# Patient Record
Sex: FEMALE | Race: WHITE | NOT HISPANIC OR LATINO | Employment: OTHER | URBAN - METROPOLITAN AREA
[De-identification: names, ages, dates, MRNs, and addresses within clinical notes are randomized per-mention and may not be internally consistent; named-entity substitution may affect disease eponyms.]

---

## 2023-11-21 ENCOUNTER — OFFICE VISIT (OUTPATIENT)
Dept: CARDIOLOGY | Facility: CLINIC | Age: 78
End: 2023-11-21
Payer: MEDICARE

## 2023-11-21 ENCOUNTER — TELEPHONE (OUTPATIENT)
Dept: CARDIOLOGY | Facility: CLINIC | Age: 78
End: 2023-11-21

## 2023-11-21 VITALS
HEIGHT: 60 IN | WEIGHT: 177 LBS | HEART RATE: 73 BPM | BODY MASS INDEX: 34.75 KG/M2 | SYSTOLIC BLOOD PRESSURE: 159 MMHG | DIASTOLIC BLOOD PRESSURE: 65 MMHG

## 2023-11-21 DIAGNOSIS — I10 HYPERTENSION, UNSPECIFIED TYPE: Primary | ICD-10-CM

## 2023-11-21 PROCEDURE — 1160F RVW MEDS BY RX/DR IN RCRD: CPT | Performed by: NURSE PRACTITIONER

## 2023-11-21 PROCEDURE — 3077F SYST BP >= 140 MM HG: CPT | Performed by: NURSE PRACTITIONER

## 2023-11-21 PROCEDURE — 3078F DIAST BP <80 MM HG: CPT | Performed by: NURSE PRACTITIONER

## 2023-11-21 PROCEDURE — 99213 OFFICE O/P EST LOW 20 MIN: CPT | Performed by: NURSE PRACTITIONER

## 2023-11-21 PROCEDURE — 99203 OFFICE O/P NEW LOW 30 MIN: CPT | Performed by: NURSE PRACTITIONER

## 2023-11-21 PROCEDURE — 1159F MED LIST DOCD IN RCRD: CPT | Performed by: NURSE PRACTITIONER

## 2023-11-21 PROCEDURE — 1036F TOBACCO NON-USER: CPT | Performed by: NURSE PRACTITIONER

## 2023-11-21 RX ORDER — SPIRONOLACTONE 50 MG/1
50 TABLET, FILM COATED ORAL DAILY
Qty: 30 TABLET | Refills: 0 | Status: SHIPPED | OUTPATIENT
Start: 2023-11-21 | End: 2023-12-20 | Stop reason: SDUPTHER

## 2023-11-21 RX ORDER — SERTRALINE HYDROCHLORIDE 25 MG/1
25 TABLET, FILM COATED ORAL
COMMUNITY
Start: 2023-11-13 | End: 2023-12-13

## 2023-11-21 RX ORDER — ROSUVASTATIN CALCIUM 5 MG/1
10 TABLET, COATED ORAL
COMMUNITY
Start: 2021-06-24

## 2023-11-21 RX ORDER — ASPIRIN 325 MG
50000 TABLET, DELAYED RELEASE (ENTERIC COATED) ORAL WEEKLY
COMMUNITY
Start: 2014-01-24

## 2023-11-21 RX ORDER — HYDRALAZINE HYDROCHLORIDE 25 MG/1
25 TABLET, FILM COATED ORAL AS NEEDED
Qty: 90 TABLET | Refills: 0 | Status: SHIPPED | OUTPATIENT
Start: 2023-11-21 | End: 2024-11-20

## 2023-11-21 RX ORDER — LISINOPRIL 20 MG/1
10 TABLET ORAL 2 TIMES DAILY
COMMUNITY
Start: 2023-05-10 | End: 2024-05-01 | Stop reason: WASHOUT

## 2023-11-21 NOTE — PATIENT INSTRUCTIONS
Check BP   - after sitting for 5-10 minutes  - not after a cup coffee   - wait until your meds are in your system by an hour     Hydralazine as needed for top number > 160    Hold lisinopril for right now    My chart me on Monday/Tuesday with update on blood pressure and numbness in legs/face

## 2023-11-21 NOTE — PROGRESS NOTES
Chief Complaint:   Hypertension    History Of Present Illness:    Maricruz Guthrie is a 78 y.o. female here with hypertension.     When blood pressure high she gets a HA and does not feel well.     2x in last week in hospital. One systolic was 222  4x gone to hospital for hypertension per last year    Follows with nephrology.     Cannot take losartan. Cannot remember why.     Notes numbness in b/l legs. CT of head negative for CVA.        Allergies:  Patient has no allergy information on record.    Visit Vitals  /65 (BP Location: Right arm, Patient Position: Sitting, BP Cuff Size: Large adult)   Pulse 73   Ht 1.524 m (5')   Wt 80.3 kg (177 lb)   BMI 34.57 kg/m²   Smoking Status Never   BSA 1.84 m²         Review of Systems  All pertinent systems have been reviewed and are negative except for what is stated in the history of present illness.    All other systems have been reviewed and are negative and noncontributory to this patient's current ailments.       Objective   Vitals reviewed.   Constitutional:       Appearance: Healthy appearance. Not in distress.   Neck:      Vascular: No JVR. JVD normal.   Pulmonary:      Effort: Pulmonary effort is normal.      Breath sounds: Normal breath sounds. No wheezing. No rhonchi. No rales.   Chest:      Chest wall: Not tender to palpatation.   Cardiovascular:      PMI at left midclavicular line. Normal rate. Regular rhythm. Normal S1. Normal S2.       Murmurs: There is no murmur.      No gallop.  No click. No rub.   Edema:     Peripheral edema absent.   Abdominal:      General: Bowel sounds are normal.      Palpations: Abdomen is soft.      Tenderness: There is no abdominal tenderness.   Musculoskeletal: Normal range of motion.         General: No tenderness. Skin:     General: Skin is warm and dry.   Neurological:      General: No focal deficit present.      Mental Status: Alert and oriented to person, place and time.       Assessment/Plan   Diagnoses and all orders for  this visit:  Hypertension, unspecified type  - home bp remains suboptimal  - we discussed how to check bp  - start anca, if tolerate, we will check labs one week after being on it  - we will hold lisinopril for right now to see if numbness resolves. While not a typical side effect of lisinopril it started when she was placed on it and zoloft. She was taken off zoloft with no change in numbness.  - hydralazine prn for systolics >160  - last Cr 1.26, Cr 4.3      Current Outpatient Medications:     cholecalciferol (Vitamin D-3) 50,000 unit capsule, Take 1 capsule (50,000 Units) by mouth once a week., Disp: , Rfl:     cyanocobalamin, vitamin B-12, 1,000 mcg/mL drops, Take by mouth once daily., Disp: , Rfl:     lisinopril 20 mg tablet, Take 1 tablet (20 mg) by mouth twice a day., Disp: , Rfl:     rosuvastatin (Crestor) 5 mg tablet, Take 1 tablet (5 mg) by mouth once daily., Disp: , Rfl:     sertraline (Zoloft) 25 mg tablet, Take 1 tablet (25 mg) by mouth once daily., Disp: , Rfl:     hydrALAZINE (Apresoline) 25 mg tablet, Take 1 tablet (25 mg) by mouth if needed (Take for systolic > 160. Can take up to 3 times a day)., Disp: 90 tablet, Rfl: 0    spironolactone (Aldactone) 50 mg tablet, Take 1 tablet (50 mg) by mouth once daily., Disp: 30 tablet, Rfl: 0    -     spironolactone (Aldactone) 50 mg tablet; Take 1 tablet (50 mg) by mouth once daily.  -     hydrALAZINE (Apresoline) 25 mg tablet; Take 1 tablet (25 mg) by mouth if needed (Take for systolic > 160. Can take up to 3 times a day).

## 2023-11-22 NOTE — TELEPHONE ENCOUNTER
"Spoke with Diana, pharmacist at [facility]. Reviewed Essie's instructions with her \"  Patient is currently holding her lisinopril and we will check labs once she is on the anca for a week   \" Verbalized understanding.   "

## 2023-12-20 DIAGNOSIS — I10 HYPERTENSION, UNSPECIFIED TYPE: ICD-10-CM

## 2023-12-20 RX ORDER — SPIRONOLACTONE 50 MG/1
50 TABLET, FILM COATED ORAL DAILY
Qty: 90 TABLET | Refills: 3 | Status: SHIPPED | OUTPATIENT
Start: 2023-12-20 | End: 2023-12-27 | Stop reason: SDUPTHER

## 2023-12-27 DIAGNOSIS — I10 HYPERTENSION, UNSPECIFIED TYPE: ICD-10-CM

## 2023-12-27 RX ORDER — SPIRONOLACTONE 50 MG/1
50 TABLET, FILM COATED ORAL DAILY
Qty: 90 TABLET | Refills: 3 | Status: SHIPPED | OUTPATIENT
Start: 2023-12-27 | End: 2024-12-26

## 2024-03-04 ENCOUNTER — LAB (OUTPATIENT)
Dept: LAB | Facility: LAB | Age: 79
End: 2024-03-04
Payer: MEDICARE

## 2024-03-04 DIAGNOSIS — N18.30 CHRONIC KIDNEY DISEASE, STAGE 3 UNSPECIFIED (MULTI): Primary | ICD-10-CM

## 2024-03-04 DIAGNOSIS — E55.9 VITAMIN D DEFICIENCY, UNSPECIFIED: ICD-10-CM

## 2024-03-04 DIAGNOSIS — I10 ESSENTIAL (PRIMARY) HYPERTENSION: ICD-10-CM

## 2024-03-04 DIAGNOSIS — M10.00 IDIOPATHIC GOUT, UNSPECIFIED SITE: ICD-10-CM

## 2024-03-04 DIAGNOSIS — N18.30 CHRONIC KIDNEY DISEASE, STAGE 3 UNSPECIFIED (MULTI): ICD-10-CM

## 2024-03-04 LAB
25(OH)D3 SERPL-MCNC: 79 NG/ML (ref 30–100)
ALBUMIN SERPL BCP-MCNC: 4.3 G/DL (ref 3.4–5)
ANION GAP SERPL CALC-SCNC: 13 MMOL/L (ref 10–20)
APPEARANCE UR: CLEAR
BACTERIA #/AREA URNS AUTO: ABNORMAL /HPF
BILIRUB UR STRIP.AUTO-MCNC: NEGATIVE MG/DL
BUN SERPL-MCNC: 28 MG/DL (ref 6–23)
CALCIUM SERPL-MCNC: 10.2 MG/DL (ref 8.6–10.6)
CHLORIDE SERPL-SCNC: 103 MMOL/L (ref 98–107)
CO2 SERPL-SCNC: 29 MMOL/L (ref 21–32)
COLOR UR: COLORLESS
CREAT SERPL-MCNC: 1.89 MG/DL (ref 0.5–1.05)
CREAT UR-MCNC: 52.2 MG/DL (ref 20–320)
CREAT UR-MCNC: 52.2 MG/DL (ref 20–320)
EGFRCR SERPLBLD CKD-EPI 2021: 27 ML/MIN/1.73M*2
GLUCOSE SERPL-MCNC: 114 MG/DL (ref 74–99)
GLUCOSE UR STRIP.AUTO-MCNC: NORMAL MG/DL
HYALINE CASTS #/AREA URNS AUTO: ABNORMAL /LPF
KETONES UR STRIP.AUTO-MCNC: NEGATIVE MG/DL
LEUKOCYTE ESTERASE UR QL STRIP.AUTO: ABNORMAL
MICROALBUMIN UR-MCNC: <7 MG/L
MICROALBUMIN/CREAT UR: NORMAL MG/G{CREAT}
MUCOUS THREADS #/AREA URNS AUTO: ABNORMAL /LPF
NITRITE UR QL STRIP.AUTO: NEGATIVE
PH UR STRIP.AUTO: 5 [PH]
PHOSPHATE SERPL-MCNC: 3.1 MG/DL (ref 2.5–4.9)
POTASSIUM SERPL-SCNC: 4.6 MMOL/L (ref 3.5–5.3)
PROT UR STRIP.AUTO-MCNC: NEGATIVE MG/DL
PROT UR-ACNC: <4 MG/DL (ref 5–24)
PROT/CREAT UR: ABNORMAL MG/G{CREAT}
PTH-INTACT SERPL-MCNC: 36.3 PG/ML (ref 18.5–88)
RBC # UR STRIP.AUTO: NEGATIVE /UL
RBC #/AREA URNS AUTO: ABNORMAL /HPF
SODIUM SERPL-SCNC: 140 MMOL/L (ref 136–145)
SP GR UR STRIP.AUTO: 1
SQUAMOUS #/AREA URNS AUTO: ABNORMAL /HPF
URATE SERPL-MCNC: 8 MG/DL (ref 2.3–6.7)
UROBILINOGEN UR STRIP.AUTO-MCNC: NORMAL MG/DL
WBC #/AREA URNS AUTO: ABNORMAL /HPF
YEAST BUDDING #/AREA UR COMP ASSIST: PRESENT /HPF

## 2024-03-04 PROCEDURE — 36415 COLL VENOUS BLD VENIPUNCTURE: CPT

## 2024-03-04 PROCEDURE — 82570 ASSAY OF URINE CREATININE: CPT

## 2024-03-04 PROCEDURE — 84156 ASSAY OF PROTEIN URINE: CPT

## 2024-03-04 PROCEDURE — 80069 RENAL FUNCTION PANEL: CPT

## 2024-03-04 PROCEDURE — 82306 VITAMIN D 25 HYDROXY: CPT

## 2024-03-04 PROCEDURE — 84550 ASSAY OF BLOOD/URIC ACID: CPT

## 2024-03-04 PROCEDURE — 83970 ASSAY OF PARATHORMONE: CPT

## 2024-03-04 PROCEDURE — 81001 URINALYSIS AUTO W/SCOPE: CPT

## 2024-03-04 PROCEDURE — 82043 UR ALBUMIN QUANTITATIVE: CPT

## 2024-03-13 ENCOUNTER — TELEPHONE (OUTPATIENT)
Dept: CARDIOLOGY | Facility: CLINIC | Age: 79
End: 2024-03-13
Payer: MEDICARE

## 2024-03-19 ENCOUNTER — OFFICE VISIT (OUTPATIENT)
Dept: CARDIOLOGY | Facility: CLINIC | Age: 79
End: 2024-03-19
Payer: MEDICARE

## 2024-03-19 VITALS
SYSTOLIC BLOOD PRESSURE: 122 MMHG | HEIGHT: 60 IN | HEART RATE: 58 BPM | DIASTOLIC BLOOD PRESSURE: 76 MMHG | WEIGHT: 175 LBS | TEMPERATURE: 97.9 F | BODY MASS INDEX: 34.36 KG/M2

## 2024-03-19 DIAGNOSIS — I10 HYPERTENSION, UNSPECIFIED TYPE: ICD-10-CM

## 2024-03-19 DIAGNOSIS — R00.2 PALPITATIONS: ICD-10-CM

## 2024-03-19 PROCEDURE — 93005 ELECTROCARDIOGRAM TRACING: CPT | Performed by: NURSE PRACTITIONER

## 2024-03-19 PROCEDURE — 1160F RVW MEDS BY RX/DR IN RCRD: CPT | Performed by: NURSE PRACTITIONER

## 2024-03-19 PROCEDURE — 1036F TOBACCO NON-USER: CPT | Performed by: NURSE PRACTITIONER

## 2024-03-19 PROCEDURE — 1159F MED LIST DOCD IN RCRD: CPT | Performed by: NURSE PRACTITIONER

## 2024-03-19 PROCEDURE — 99214 OFFICE O/P EST MOD 30 MIN: CPT | Performed by: NURSE PRACTITIONER

## 2024-03-19 PROCEDURE — 3074F SYST BP LT 130 MM HG: CPT | Performed by: NURSE PRACTITIONER

## 2024-03-19 PROCEDURE — 3078F DIAST BP <80 MM HG: CPT | Performed by: NURSE PRACTITIONER

## 2024-03-19 RX ORDER — CHLORTHALIDONE 25 MG/1
25 TABLET ORAL DAILY
COMMUNITY

## 2024-03-19 NOTE — PROGRESS NOTES
"Chief Complaint:   Palpitations     History Of Present Illness:    Maricruz Guthrie is a 78 y.o. female here with palpitations. Describes as a intermittent racing sensation. Feel like running then it would resolve. Has not had over the past several days. Always at rest.     Last week renal decreased lisinopril. Since then the racing heart sensation subsided.      Notes numbness in b/l legs. CT of head negative for CVA.   Once a month for the past 3 months her hands turned blue. Does not know why. Was not cold. Self resolved. If continues she will need referral to vascular. Was not SOB. Had no other symptoms when this occurred.        Allergies:  Statins-hmg-coa reductase inhibitors, Penicillins, and Niacin    Review of Systems  All pertinent systems have been reviewed and are negative except for what is stated in the history of present illness.    All other systems have been reviewed and are negative and noncontributory to this patient's current ailments.     Visit Vitals  /76 (BP Location: Right arm)   Pulse 58   Temp 36.6 °C (97.9 °F)   Ht 1.524 m (5')   Wt 79.4 kg (175 lb)   BMI 34.18 kg/m²   Smoking Status Former   BSA 1.83 m²       Last Labs:  CBC -  No results found for: \"WBC\", \"HGB\", \"HCT\", \"MCV\", \"PLT\"    CMP -  Lab Results   Component Value Date    CALCIUM 10.2 03/04/2024    PHOS 3.1 03/04/2024    ALBUMIN 4.3 03/04/2024       LIPID PANEL -   No results found for: \"CHOL\", \"TRIG\", \"HDL\", \"CHHDL\", \"LDLF\", \"VLDL\", \"NHDL\"    RENAL FUNCTION PANEL -   Lab Results   Component Value Date    GLUCOSE 114 (H) 03/04/2024     03/04/2024    K 4.6 03/04/2024     03/04/2024    CO2 29 03/04/2024    ANIONGAP 13 03/04/2024    BUN 28 (H) 03/04/2024    CREATININE 1.89 (H) 03/04/2024    CALCIUM 10.2 03/04/2024    PHOS 3.1 03/04/2024    ALBUMIN 4.3 03/04/2024        Lab Results   Component Value Date    HGBA1C 5.9 (H) 08/29/2023         Objective   Vitals reviewed.   Constitutional:       Appearance: Healthy " appearance. Not in distress.   Eyes:      Conjunctiva/sclera: Conjunctivae normal.   Neck:      Vascular: No JVR. JVD normal.   Pulmonary:      Effort: Pulmonary effort is normal.      Breath sounds: Normal breath sounds. No wheezing. No rhonchi. No rales.   Chest:      Chest wall: Not tender to palpatation.   Cardiovascular:      PMI at left midclavicular line. Normal rate. Regular rhythm. Normal S1. Normal S2.       Murmurs: There is no murmur.      No gallop.  No click. No rub.   Edema:     Peripheral edema absent.   Abdominal:      General: Bowel sounds are normal.      Palpations: Abdomen is soft.      Tenderness: There is no abdominal tenderness.   Musculoskeletal: Normal range of motion.         General: No tenderness. Skin:     General: Skin is warm and dry.   Neurological:      General: No focal deficit present.      Mental Status: Alert and oriented to person, place and time.   Psychiatric:         Attention and Perception: Attention normal.         Mood and Affect: Mood normal.       Assessment/Plan   Diagnoses and all orders for this visit:  Hypertension, unspecified type  - bp well controlled   - nephrology decreased lisinopril  Palpitations  - improved post decrease in lisinopril  - if palps return will place monitor     Follow up in 1 month    1 month follow up       Current Outpatient Medications:     chlorthalidone (Hygroton) 25 mg tablet, Take 1 tablet (25 mg) by mouth once daily., Disp: , Rfl:     cholecalciferol (Vitamin D-3) 50,000 unit capsule, Take 1 capsule (50,000 Units) by mouth once a week., Disp: , Rfl:     cyanocobalamin, vitamin B-12, 1,000 mcg/mL drops, Take by mouth once daily., Disp: , Rfl:     lisinopril 20 mg tablet, Take 0.5 tablets (10 mg) by mouth twice a day., Disp: , Rfl:     rosuvastatin (Crestor) 5 mg tablet, Take 2 tablets (10 mg) by mouth once daily., Disp: , Rfl:     spironolactone (Aldactone) 50 mg tablet, Take 1 tablet (50 mg) by mouth once daily., Disp: 90 tablet, Rfl:  3    hydrALAZINE (Apresoline) 25 mg tablet, Take 1 tablet (25 mg) by mouth if needed (Take for systolic > 160. Can take up to 3 times a day). (Patient not taking: Reported on 3/19/2024), Disp: 90 tablet, Rfl: 0    sertraline (Zoloft) 25 mg tablet, Take 1 tablet (25 mg) by mouth once daily., Disp: , Rfl:     Exclusive of any other services or procedures performed, I, Essie PATE, spent 30 minutes in duration for this visit today.  This time consisted of chart review, obtaining history, and/or performing the exam as documented above, as well as, documenting the clinical information for the encounter in the electronic record, discussing treatment options, plans, and/or goals with patient, family, and/or caregiver, refilling medications, updating the electronic record, ordering medicines, lab work, imaging, referrals, and/or procedures as documented above and communicating with other [facility] professionals. I have discussed the results of laboratory, radiology, and cardiology studies with the patient and their family/caregiver.

## 2024-03-25 LAB
ATRIAL RATE: 52 BPM
P AXIS: 37 DEGREES
P OFFSET: 189 MS
P ONSET: 144 MS
PR INTERVAL: 140 MS
Q ONSET: 214 MS
QRS COUNT: 8 BEATS
QRS DURATION: 86 MS
QT INTERVAL: 430 MS
QTC CALCULATION(BAZETT): 399 MS
QTC FREDERICIA: 409 MS
R AXIS: -21 DEGREES
T AXIS: 65 DEGREES
T OFFSET: 429 MS
VENTRICULAR RATE: 52 BPM

## 2024-05-01 ENCOUNTER — OFFICE VISIT (OUTPATIENT)
Dept: CARDIOLOGY | Facility: CLINIC | Age: 79
End: 2024-05-01
Payer: MEDICARE

## 2024-05-01 VITALS
BODY MASS INDEX: 34.54 KG/M2 | HEIGHT: 60 IN | DIASTOLIC BLOOD PRESSURE: 66 MMHG | WEIGHT: 175.9 LBS | HEART RATE: 58 BPM | SYSTOLIC BLOOD PRESSURE: 118 MMHG

## 2024-05-01 DIAGNOSIS — R00.2 PALPITATIONS: ICD-10-CM

## 2024-05-01 DIAGNOSIS — I10 HYPERTENSION, UNSPECIFIED TYPE: Primary | ICD-10-CM

## 2024-05-01 PROCEDURE — 99213 OFFICE O/P EST LOW 20 MIN: CPT | Performed by: NURSE PRACTITIONER

## 2024-05-01 PROCEDURE — 1036F TOBACCO NON-USER: CPT | Performed by: NURSE PRACTITIONER

## 2024-05-01 PROCEDURE — 3078F DIAST BP <80 MM HG: CPT | Performed by: NURSE PRACTITIONER

## 2024-05-01 PROCEDURE — 3074F SYST BP LT 130 MM HG: CPT | Performed by: NURSE PRACTITIONER

## 2024-05-01 PROCEDURE — 1160F RVW MEDS BY RX/DR IN RCRD: CPT | Performed by: NURSE PRACTITIONER

## 2024-05-01 PROCEDURE — 1159F MED LIST DOCD IN RCRD: CPT | Performed by: NURSE PRACTITIONER

## 2024-05-01 RX ORDER — LOSARTAN POTASSIUM 25 MG/1
12.5 TABLET ORAL DAILY
Qty: 30 TABLET | Refills: 0 | Status: SHIPPED | OUTPATIENT
Start: 2024-05-01 | End: 2025-05-01

## 2024-05-01 NOTE — PROGRESS NOTES
"Chief Complaint:   Palpitations     History Of Present Illness:    Maricruz Guthrie is a 78 y.o. female here with palpitations. Describes as a intermittent racing sensation. Feel like running then it would resolve. It improved with decrease in lisinopril.   Today her palpitations and chest pain remain resolved.      Allergies:  Statins-hmg-coa reductase inhibitors, Penicillins, and Niacin    Review of Systems  All pertinent systems have been reviewed and are negative except for what is stated in the history of present illness.    All other systems have been reviewed and are negative and noncontributory to this patient's current ailments.     Visit Vitals  /66 (BP Location: Right arm, Patient Position: Sitting, BP Cuff Size: Large adult)   Pulse 58   Ht 1.524 m (5')   Wt 79.8 kg (175 lb 14.4 oz)   BMI 34.35 kg/m²   Smoking Status Former   BSA 1.84 m²         Last Labs:  CBC -  No results found for: \"WBC\", \"HGB\", \"HCT\", \"MCV\", \"PLT\"    CMP -  Lab Results   Component Value Date    CALCIUM 10.2 03/04/2024    PHOS 3.1 03/04/2024    ALBUMIN 4.3 03/04/2024       LIPID PANEL -   No results found for: \"CHOL\", \"TRIG\", \"HDL\", \"CHHDL\", \"LDLF\", \"VLDL\", \"NHDL\"    RENAL FUNCTION PANEL -   Lab Results   Component Value Date    GLUCOSE 114 (H) 03/04/2024     03/04/2024    K 4.6 03/04/2024     03/04/2024    CO2 29 03/04/2024    ANIONGAP 13 03/04/2024    BUN 28 (H) 03/04/2024    CREATININE 1.89 (H) 03/04/2024    CALCIUM 10.2 03/04/2024    PHOS 3.1 03/04/2024    ALBUMIN 4.3 03/04/2024        Lab Results   Component Value Date    HGBA1C 5.9 (H) 08/29/2023         Objective   Vitals reviewed.   Constitutional:       Appearance: Healthy appearance. Not in distress.   Eyes:      Conjunctiva/sclera: Conjunctivae normal.   Neck:      Vascular: No JVR. JVD normal.   Pulmonary:      Effort: Pulmonary effort is normal.      Breath sounds: Normal breath sounds. No wheezing. No rhonchi. No rales.   Chest:      Chest wall: Not " tender to palpatation.   Cardiovascular:      PMI at left midclavicular line. Normal rate. Regular rhythm. Normal S1. Normal S2.       Murmurs: There is no murmur.      No gallop.  No click. No rub.   Edema:     Peripheral edema absent.   Abdominal:      General: Bowel sounds are normal.      Palpations: Abdomen is soft.      Tenderness: There is no abdominal tenderness.   Musculoskeletal: Normal range of motion.         General: No tenderness. Skin:     General: Skin is warm and dry.   Neurological:      General: No focal deficit present.      Mental Status: Alert and oriented to person, place and time.   Psychiatric:         Attention and Perception: Attention normal.         Mood and Affect: Mood normal.       Assessment/Plan   Diagnoses and all orders for this visit:  Hypertension, unspecified type  - bp well controlled   - chronic cough may be 2/2 lisinopril change to losartan. Will call with bp readings after being on arb.   Palpitations  - resolved     Follow up annually       Current Outpatient Medications:     chlorthalidone (Hygroton) 25 mg tablet, Take 1 tablet (25 mg) by mouth once daily. Taking 1/2 pill, Disp: , Rfl:     cholecalciferol (Vitamin D-3) 50,000 unit capsule, Take 1 capsule (50,000 Units) by mouth once a week., Disp: , Rfl:     cyanocobalamin, vitamin B-12, 1,000 mcg/mL drops, Take by mouth once daily., Disp: , Rfl:     hydrALAZINE (Apresoline) 25 mg tablet, Take 1 tablet (25 mg) by mouth if needed (Take for systolic > 160. Can take up to 3 times a day). (Patient taking differently: Take 1 tablet (25 mg) by mouth if needed (Take for systolic > 160. Can take up to 3 times a day). Taking 1/2 tab), Disp: 90 tablet, Rfl: 0    rosuvastatin (Crestor) 5 mg tablet, Take 2 tablets (10 mg) by mouth once daily., Disp: , Rfl:     spironolactone (Aldactone) 50 mg tablet, Take 1 tablet (50 mg) by mouth once daily., Disp: 90 tablet, Rfl: 3    losartan (Cozaar) 25 mg tablet, Take 0.5 tablets (12.5 mg) by  mouth once daily., Disp: 30 tablet, Rfl: 0    sertraline (Zoloft) 25 mg tablet, Take 1 tablet (25 mg) by mouth once daily., Disp: , Rfl:       Exclusive of any other services or procedures performed, I, Essie PATE, spent 20 minutes in duration for this visit today.  This time consisted of chart review, obtaining history, and/or performing the exam as documented above, as well as, documenting the clinical information for the encounter in the electronic record, discussing treatment options, plans, and/or goals with patient, family, and/or caregiver, refilling medications, updating the electronic record, ordering medicines, lab work, imaging, referrals, and/or procedures as documented above and communicating with other [facility] professionals. I have discussed the results of laboratory, radiology, and cardiology studies with the patient and their family/caregiver.

## 2024-06-25 DIAGNOSIS — I10 HYPERTENSION, UNSPECIFIED TYPE: ICD-10-CM

## 2024-06-25 RX ORDER — LOSARTAN POTASSIUM 25 MG/1
25 TABLET ORAL DAILY
Qty: 45 TABLET | Refills: 3 | Status: SHIPPED | OUTPATIENT
Start: 2024-06-25

## 2024-07-01 ENCOUNTER — LAB (OUTPATIENT)
Dept: LAB | Facility: LAB | Age: 79
End: 2024-07-01
Payer: MEDICARE

## 2024-07-01 DIAGNOSIS — N18.30 CHRONIC KIDNEY DISEASE, STAGE 3 UNSPECIFIED (MULTI): Primary | ICD-10-CM

## 2024-07-01 DIAGNOSIS — I10 ESSENTIAL (PRIMARY) HYPERTENSION: ICD-10-CM

## 2024-07-01 LAB
ALBUMIN SERPL BCP-MCNC: 4.3 G/DL (ref 3.4–5)
ANION GAP SERPL CALC-SCNC: 13 MMOL/L (ref 10–20)
BUN SERPL-MCNC: 28 MG/DL (ref 6–23)
CALCIUM SERPL-MCNC: 10.2 MG/DL (ref 8.6–10.6)
CHLORIDE SERPL-SCNC: 103 MMOL/L (ref 98–107)
CO2 SERPL-SCNC: 28 MMOL/L (ref 21–32)
CREAT SERPL-MCNC: 1.84 MG/DL (ref 0.5–1.05)
EGFRCR SERPLBLD CKD-EPI 2021: 28 ML/MIN/1.73M*2
GLUCOSE SERPL-MCNC: 151 MG/DL (ref 74–99)
PHOSPHATE SERPL-MCNC: 3 MG/DL (ref 2.5–4.9)
POTASSIUM SERPL-SCNC: 4.5 MMOL/L (ref 3.5–5.3)
SODIUM SERPL-SCNC: 139 MMOL/L (ref 136–145)

## 2024-07-01 PROCEDURE — 36415 COLL VENOUS BLD VENIPUNCTURE: CPT

## 2024-07-01 PROCEDURE — 80069 RENAL FUNCTION PANEL: CPT

## 2025-02-19 ENCOUNTER — APPOINTMENT (OUTPATIENT)
Dept: RADIOLOGY | Facility: HOSPITAL | Age: 80
End: 2025-02-19
Payer: MEDICARE

## 2025-02-19 ENCOUNTER — APPOINTMENT (OUTPATIENT)
Dept: CARDIOLOGY | Facility: HOSPITAL | Age: 80
End: 2025-02-19
Payer: MEDICARE

## 2025-02-19 ENCOUNTER — HOSPITAL ENCOUNTER (OUTPATIENT)
Facility: HOSPITAL | Age: 80
Setting detail: OBSERVATION
Discharge: HOME | End: 2025-02-20
Attending: EMERGENCY MEDICINE | Admitting: STUDENT IN AN ORGANIZED HEALTH CARE EDUCATION/TRAINING PROGRAM
Payer: MEDICARE

## 2025-02-19 DIAGNOSIS — R07.9 CHEST PAIN, UNSPECIFIED TYPE: Primary | ICD-10-CM

## 2025-02-19 LAB
ALBUMIN SERPL BCP-MCNC: 4.4 G/DL (ref 3.4–5)
ALP SERPL-CCNC: 86 U/L (ref 33–136)
ALT SERPL W P-5'-P-CCNC: 22 U/L (ref 7–45)
ANION GAP SERPL CALC-SCNC: 16 MMOL/L (ref 10–20)
AST SERPL W P-5'-P-CCNC: 25 U/L (ref 9–39)
BASOPHILS # BLD AUTO: 0.07 X10*3/UL (ref 0–0.1)
BASOPHILS NFR BLD AUTO: 0.7 %
BILIRUB SERPL-MCNC: 0.5 MG/DL (ref 0–1.2)
BUN SERPL-MCNC: 30 MG/DL (ref 6–23)
CALCIUM SERPL-MCNC: 10.1 MG/DL (ref 8.6–10.3)
CARDIAC TROPONIN I PNL SERPL HS: 12 NG/L (ref 0–13)
CARDIAC TROPONIN I PNL SERPL HS: 16 NG/L (ref 0–13)
CARDIAC TROPONIN I PNL SERPL HS: 16 NG/L (ref 0–13)
CHLORIDE SERPL-SCNC: 102 MMOL/L (ref 98–107)
CO2 SERPL-SCNC: 23 MMOL/L (ref 21–32)
CREAT SERPL-MCNC: 2.07 MG/DL (ref 0.5–1.05)
D DIMER PPP FEU-MCNC: 890 NG/ML FEU
EGFRCR SERPLBLD CKD-EPI 2021: 24 ML/MIN/1.73M*2
EOSINOPHIL # BLD AUTO: 1.12 X10*3/UL (ref 0–0.4)
EOSINOPHIL NFR BLD AUTO: 10.8 %
ERYTHROCYTE [DISTWIDTH] IN BLOOD BY AUTOMATED COUNT: 13.2 % (ref 11.5–14.5)
FLUAV RNA RESP QL NAA+PROBE: NOT DETECTED
FLUBV RNA RESP QL NAA+PROBE: NOT DETECTED
GLUCOSE BLD MANUAL STRIP-MCNC: 100 MG/DL (ref 74–99)
GLUCOSE SERPL-MCNC: 103 MG/DL (ref 74–99)
HCT VFR BLD AUTO: 42.1 % (ref 36–46)
HGB BLD-MCNC: 13.7 G/DL (ref 12–16)
IMM GRANULOCYTES # BLD AUTO: 0.04 X10*3/UL (ref 0–0.5)
IMM GRANULOCYTES NFR BLD AUTO: 0.4 % (ref 0–0.9)
INR PPP: 1.1 (ref 0.9–1.1)
LYMPHOCYTES # BLD AUTO: 1.82 X10*3/UL (ref 0.8–3)
LYMPHOCYTES NFR BLD AUTO: 17.6 %
MAGNESIUM SERPL-MCNC: 2.07 MG/DL (ref 1.6–2.4)
MCH RBC QN AUTO: 30.1 PG (ref 26–34)
MCHC RBC AUTO-ENTMCNC: 32.5 G/DL (ref 32–36)
MCV RBC AUTO: 93 FL (ref 80–100)
MONOCYTES # BLD AUTO: 0.74 X10*3/UL (ref 0.05–0.8)
MONOCYTES NFR BLD AUTO: 7.1 %
NEUTROPHILS # BLD AUTO: 6.58 X10*3/UL (ref 1.6–5.5)
NEUTROPHILS NFR BLD AUTO: 63.4 %
NRBC BLD-RTO: 0 /100 WBCS (ref 0–0)
PLATELET # BLD AUTO: 232 X10*3/UL (ref 150–450)
POTASSIUM SERPL-SCNC: 4.1 MMOL/L (ref 3.5–5.3)
PROT SERPL-MCNC: 7.6 G/DL (ref 6.4–8.2)
PROTHROMBIN TIME: 12.6 SECONDS (ref 9.8–12.8)
RBC # BLD AUTO: 4.55 X10*6/UL (ref 4–5.2)
SARS-COV-2 RNA RESP QL NAA+PROBE: NOT DETECTED
SODIUM SERPL-SCNC: 137 MMOL/L (ref 136–145)
WBC # BLD AUTO: 10.4 X10*3/UL (ref 4.4–11.3)

## 2025-02-19 PROCEDURE — 85025 COMPLETE CBC W/AUTO DIFF WBC: CPT | Performed by: EMERGENCY MEDICINE

## 2025-02-19 PROCEDURE — 78830 RP LOCLZJ TUM SPECT W/CT 1: CPT

## 2025-02-19 PROCEDURE — 36415 COLL VENOUS BLD VENIPUNCTURE: CPT | Performed by: EMERGENCY MEDICINE

## 2025-02-19 PROCEDURE — 82947 ASSAY GLUCOSE BLOOD QUANT: CPT | Mod: 59

## 2025-02-19 PROCEDURE — 80061 LIPID PANEL: CPT | Performed by: NURSE PRACTITIONER

## 2025-02-19 PROCEDURE — 71045 X-RAY EXAM CHEST 1 VIEW: CPT

## 2025-02-19 PROCEDURE — 99285 EMERGENCY DEPT VISIT HI MDM: CPT | Mod: 25 | Performed by: EMERGENCY MEDICINE

## 2025-02-19 PROCEDURE — 71045 X-RAY EXAM CHEST 1 VIEW: CPT | Performed by: RADIOLOGY

## 2025-02-19 PROCEDURE — 83735 ASSAY OF MAGNESIUM: CPT | Performed by: EMERGENCY MEDICINE

## 2025-02-19 PROCEDURE — 84484 ASSAY OF TROPONIN QUANT: CPT | Performed by: EMERGENCY MEDICINE

## 2025-02-19 PROCEDURE — 85379 FIBRIN DEGRADATION QUANT: CPT | Performed by: EMERGENCY MEDICINE

## 2025-02-19 PROCEDURE — 2500000001 HC RX 250 WO HCPCS SELF ADMINISTERED DRUGS (ALT 637 FOR MEDICARE OP): Performed by: EMERGENCY MEDICINE

## 2025-02-19 PROCEDURE — 93005 ELECTROCARDIOGRAM TRACING: CPT | Mod: 59

## 2025-02-19 PROCEDURE — 93005 ELECTROCARDIOGRAM TRACING: CPT

## 2025-02-19 PROCEDURE — 96375 TX/PRO/DX INJ NEW DRUG ADDON: CPT | Mod: 59

## 2025-02-19 PROCEDURE — 85610 PROTHROMBIN TIME: CPT | Performed by: EMERGENCY MEDICINE

## 2025-02-19 PROCEDURE — A9540 TC99M MAA: HCPCS | Performed by: EMERGENCY MEDICINE

## 2025-02-19 PROCEDURE — 87636 SARSCOV2 & INF A&B AMP PRB: CPT | Performed by: EMERGENCY MEDICINE

## 2025-02-19 PROCEDURE — 2500000004 HC RX 250 GENERAL PHARMACY W/ HCPCS (ALT 636 FOR OP/ED): Performed by: EMERGENCY MEDICINE

## 2025-02-19 PROCEDURE — 78830 RP LOCLZJ TUM SPECT W/CT 1: CPT | Performed by: RADIOLOGY

## 2025-02-19 PROCEDURE — 3430000001 HC RX 343 DIAGNOSTIC RADIOPHARMACEUTICALS: Performed by: EMERGENCY MEDICINE

## 2025-02-19 PROCEDURE — 80053 COMPREHEN METABOLIC PANEL: CPT | Performed by: EMERGENCY MEDICINE

## 2025-02-19 RX ORDER — NITROGLYCERIN 0.4 MG/1
0.4 TABLET SUBLINGUAL EVERY 5 MIN PRN
Status: DISCONTINUED | OUTPATIENT
Start: 2025-02-19 | End: 2025-02-20 | Stop reason: HOSPADM

## 2025-02-19 RX ORDER — NAPROXEN SODIUM 220 MG/1
243 TABLET, FILM COATED ORAL ONCE
Status: COMPLETED | OUTPATIENT
Start: 2025-02-19 | End: 2025-02-19

## 2025-02-19 RX ORDER — FAMOTIDINE 10 MG/ML
20 INJECTION, SOLUTION INTRAVENOUS ONCE
Status: COMPLETED | OUTPATIENT
Start: 2025-02-19 | End: 2025-02-19

## 2025-02-19 RX ADMIN — FAMOTIDINE 20 MG: 10 INJECTION, SOLUTION INTRAVENOUS at 18:18

## 2025-02-19 RX ADMIN — ASPIRIN 243 MG: 81 TABLET, CHEWABLE ORAL at 15:22

## 2025-02-19 RX ADMIN — NITROGLYCERIN 0.4 MG: 0.4 TABLET SUBLINGUAL at 16:37

## 2025-02-19 RX ADMIN — KIT FOR THE PREPARATION OF TECHNETIUM TC 99M ALBUMIN AGGREGATED 4 MILLICURIE: 2.5 INJECTION, POWDER, FOR SOLUTION INTRAVENOUS at 19:08

## 2025-02-19 ASSESSMENT — COLUMBIA-SUICIDE SEVERITY RATING SCALE - C-SSRS
1. IN THE PAST MONTH, HAVE YOU WISHED YOU WERE DEAD OR WISHED YOU COULD GO TO SLEEP AND NOT WAKE UP?: NO
2. HAVE YOU ACTUALLY HAD ANY THOUGHTS OF KILLING YOURSELF?: NO
6. HAVE YOU EVER DONE ANYTHING, STARTED TO DO ANYTHING, OR PREPARED TO DO ANYTHING TO END YOUR LIFE?: NO

## 2025-02-19 ASSESSMENT — PAIN DESCRIPTION - LOCATION
LOCATION: CHEST
LOCATION: CHEST

## 2025-02-19 ASSESSMENT — PAIN SCALES - GENERAL
PAINLEVEL_OUTOF10: 2

## 2025-02-19 ASSESSMENT — PAIN - FUNCTIONAL ASSESSMENT: PAIN_FUNCTIONAL_ASSESSMENT: 0-10

## 2025-02-19 ASSESSMENT — PAIN DESCRIPTION - DESCRIPTORS
DESCRIPTORS: PRESSURE
DESCRIPTORS: PRESSURE

## 2025-02-19 ASSESSMENT — PAIN DESCRIPTION - FREQUENCY: FREQUENCY: INTERMITTENT

## 2025-02-19 NOTE — ED PROVIDER NOTES
History/Exam limitations: none.   Additional history was obtained from patient and past medical records.          HPI:    Maricruz Guthrie is a 79 y.o. female PMH hypertension, hyperlipidemia, TIA, Guillain-Barré presenting for evaluation of chest pain.  Patient states has been having chest pressure off and on for the last week.  She states that the symptoms have been random intermittent with no clear exertional or positional component.  Patient states that this morning she had onset of an episode that was pressure-like in her midsternal area that felt similar to prior but then began having radiation to her right shoulder and tingling down her right arm.  She states that it was persistent so she called EMS.  Just prior to EMS arrival she states that the symptoms improved however she still has some mild discomfort in her midsternal area.  Has a chronic cough that is unchanged.  No hemoptysis, unilateral leg swelling, VTE history, prolonged immobilization.  No headache and vision changes, fever, chills, abdominal pain, diarrhea, dysuria.  No pleuritic symptoms.          Physical Exam:  ED Triage Vitals   Temperature Heart Rate Respirations BP   02/19/25 1453 02/19/25 1430 02/19/25 1430 02/19/25 1430   36.4 °C (97.5 °F) 67 20 130/66      Pulse Ox Temp Source Heart Rate Source Patient Position   02/19/25 1430 02/19/25 1453 -- --   98 % Oral        BP Location FiO2 (%)     -- --              GEN:      Alert, NAD  Eyes:       PERRL, EOMI  HENT:      NC/AT, OP clear, airway patent, MM  CV:      RRR, no MRG, no LE pitting edema, 2+ radial and pedal pulses  PULM:     CTAB, no w/r/r, easy WOB, symmetric chest rise  ABD:      Soft, NT, ND, no masses, BS +  :       No CVA TTP  NEURO:   A&Ox3, no focal deficits    MSK:      FROM, no joint deformities or swelling, no e/o trauma  SKIN:       Warm and dry  PSYCH:    Appropriate mood and affect         MDM/ED Course:   Maricruz Guthrie is a 79 y.o. female PMH hypertension,  hyperlipidemia, TIA, Guillain-Barré presenting for evaluation of chest pain.  Vitals reassuring.  Exam as documented above.  Differential includes ACS, PNA, PE, aortic dissection/pathology, pneumothorax, pericardial effusion, pericarditis, myocarditis, GERD, musculoskeletal pain, pleurisy.  Patient symptoms sound concerning for ACS given quality of symptoms.  Patient is given p.o. aspirin.  Placed and labs drawn.  EKG as below.  Chemistry with baseline renal function and reassuring electrolytes.  CBC with no leukocytosis.  Magnesium reassuring.  Initial troponin reassuring at 12 ng/L.  Somewhat reassuring given patient has been having symptoms over the last week however more significant episode prior to arrival in the ED.  Patient did have some worsening discomfort in the ED, pain is not improved by nitroglycerin.  Pepcid was provided.  Chest x-ray with no acute findings, reports recent colonoscopy that had to be aborted due to ?insufficient prep, no evidence of perforation or free air under the diaphragm, no abdominal tenderness.  D-dimer was added given worsening pain and reassuring troponin, elevated at 890, above age-adjusted cutoff.  VQ scan obtained and pending.  Second troponin elevated at 16 from 12, very subtle uptrend, patient benefit from third troponin given this.  Patient does have numerous risk factors, has not had provocative testing, would likely benefit from stress either in near future.  Patient care signed to my oncoming colleague at shift change pending VQ scan, third troponin and likely plan for hospitalization given quality of symptoms and risk factors/age.    EKG reviewed by me: 2:33 PM sinus bradycardia, rate 58, left axis, normal intervals, no STEMI, very subtle ST depression in lead I and aVL, similar to prior EKG from March 2024.    EKG reviewed by me: Sinus bradycardia, rate 59, left axis, normal intervals, no STEMI, borderline ST depression in lead I and aVL slightly more pronounced than  prior however was also present on prior EKG from March 2024.     ED Course as of 02/20/25 1451   Wed Feb 19, 2025   1728 No improvement in chest pain with SLN.  [JM]   2015 Pt received in sign out pending VQ scan and troponin. Plan obs/admit after results/reeval [LP]   2333 Reevaluated after signout, updated patient with labs and plan of care pending VQ scan [LP]   Thu Feb 20, 2025   0517 Spoke with observation regarding the patient, they agreed to admit.  Patient has been otherwise stable.  As the patient's been stable hemodynamically without other risk factors for PE we will hold on heparin drip at this time.  VQ scan results pending. [LP]      ED Course User Index  [JM] Leobardo Rodriguez MD  [LP] Diana Chaudhary DO         Diagnoses as of 02/20/25 1451   Chest pain, unspecified type         Chronic medical conditions affecting care listed in MDM. I independently reviewed imaging studies and agreed with radiology reads. I reviewed recent and relevant outside records including PCP notes, Prior discharge summaries, and prior radiology reports.    Procedure  Procedures    Diagnosis:   1.  Chest pain    Dispo: Handoff in stable condition      Disclaimer: Portions of this note were dictated by speech recognition. An attempt at proof reading was made to minimize errors. Minor errors in transcription may be present.  Please call if questions.     Leobardo Rodriguez MD  02/20/25 1451

## 2025-02-19 NOTE — ED TRIAGE NOTES
Pt. To ED from home for intermittent chest pressure x 1 week. Patient states to day pain radiated down both arms. Patient denies any cardiac history.

## 2025-02-20 ENCOUNTER — APPOINTMENT (OUTPATIENT)
Dept: CARDIOLOGY | Facility: HOSPITAL | Age: 80
End: 2025-02-20
Payer: MEDICARE

## 2025-02-20 ENCOUNTER — PHARMACY VISIT (OUTPATIENT)
Dept: PHARMACY | Facility: CLINIC | Age: 80
End: 2025-02-20
Payer: COMMERCIAL

## 2025-02-20 VITALS
HEART RATE: 47 BPM | SYSTOLIC BLOOD PRESSURE: 126 MMHG | BODY MASS INDEX: 33.38 KG/M2 | HEIGHT: 60 IN | TEMPERATURE: 98.2 F | RESPIRATION RATE: 18 BRPM | DIASTOLIC BLOOD PRESSURE: 86 MMHG | WEIGHT: 170 LBS | OXYGEN SATURATION: 98 %

## 2025-02-20 PROBLEM — R07.9 CHEST PAIN: Status: ACTIVE | Noted: 2025-02-20

## 2025-02-20 PROBLEM — R07.89 CHEST PRESSURE: Status: ACTIVE | Noted: 2025-02-20

## 2025-02-20 LAB
AORTIC VALVE MEAN GRADIENT: 6 MMHG
AORTIC VALVE PEAK VELOCITY: 1.65 M/S
ATRIAL RATE: 58 BPM
ATRIAL RATE: 59 BPM
AV PEAK GRADIENT: 11 MMHG
AVA (PEAK VEL): 2.06 CM2
AVA (VTI): 1.9 CM2
CHOLEST SERPL-MCNC: 166 MG/DL (ref 0–199)
CHOLESTEROL/HDL RATIO: 4.6
EJECTION FRACTION APICAL 4 CHAMBER: 62.9
EJECTION FRACTION: 61 %
HDLC SERPL-MCNC: 36.4 MG/DL
LDLC SERPL CALC-MCNC: 91 MG/DL
LEFT ATRIUM VOLUME AREA LENGTH INDEX BSA: 24.3 ML/M2
LEFT VENTRICLE INTERNAL DIMENSION DIASTOLE: 4.04 CM (ref 3.5–6)
LEFT VENTRICULAR OUTFLOW TRACT DIAMETER: 1.89 CM
MITRAL VALVE E/A RATIO: 0.75
NON HDL CHOLESTEROL: 130 MG/DL (ref 0–149)
P AXIS: 15 DEGREES
P AXIS: 5 DEGREES
P OFFSET: 194 MS
P OFFSET: 199 MS
P ONSET: 140 MS
P ONSET: 144 MS
PR INTERVAL: 138 MS
PR INTERVAL: 148 MS
Q ONSET: 213 MS
Q ONSET: 214 MS
QRS COUNT: 10 BEATS
QRS COUNT: 10 BEATS
QRS DURATION: 86 MS
QRS DURATION: 90 MS
QT INTERVAL: 446 MS
QT INTERVAL: 450 MS
QTC CALCULATION(BAZETT): 441 MS
QTC CALCULATION(BAZETT): 441 MS
QTC FREDERICIA: 443 MS
QTC FREDERICIA: 444 MS
R AXIS: -33 DEGREES
R AXIS: -47 DEGREES
RIGHT VENTRICLE FREE WALL PEAK S': 9 CM/S
RIGHT VENTRICLE PEAK SYSTOLIC PRESSURE: 23 MMHG
T AXIS: 45 DEGREES
T AXIS: 87 DEGREES
T OFFSET: 437 MS
T OFFSET: 438 MS
TRICUSPID ANNULAR PLANE SYSTOLIC EXCURSION: 1.6 CM
TRIGL SERPL-MCNC: 193 MG/DL (ref 0–149)
VENTRICULAR RATE: 58 BPM
VENTRICULAR RATE: 59 BPM
VLDL: 39 MG/DL (ref 0–40)

## 2025-02-20 PROCEDURE — 93325 DOPPLER ECHO COLOR FLOW MAPG: CPT | Mod: 59

## 2025-02-20 PROCEDURE — 99222 1ST HOSP IP/OBS MODERATE 55: CPT | Performed by: INTERNAL MEDICINE

## 2025-02-20 PROCEDURE — 96365 THER/PROPH/DIAG IV INF INIT: CPT | Mod: 59

## 2025-02-20 PROCEDURE — 2500000004 HC RX 250 GENERAL PHARMACY W/ HCPCS (ALT 636 FOR OP/ED): Performed by: NURSE PRACTITIONER

## 2025-02-20 PROCEDURE — RXMED WILLOW AMBULATORY MEDICATION CHARGE

## 2025-02-20 PROCEDURE — 93016 CV STRESS TEST SUPVJ ONLY: CPT | Performed by: INTERNAL MEDICINE

## 2025-02-20 PROCEDURE — 93018 CV STRESS TEST I&R ONLY: CPT | Performed by: INTERNAL MEDICINE

## 2025-02-20 PROCEDURE — 93350 STRESS TTE ONLY: CPT | Performed by: INTERNAL MEDICINE

## 2025-02-20 PROCEDURE — G0378 HOSPITAL OBSERVATION PER HR: HCPCS

## 2025-02-20 PROCEDURE — 96372 THER/PROPH/DIAG INJ SC/IM: CPT | Mod: 59 | Performed by: NURSE PRACTITIONER

## 2025-02-20 PROCEDURE — 93325 DOPPLER ECHO COLOR FLOW MAPG: CPT | Performed by: INTERNAL MEDICINE

## 2025-02-20 PROCEDURE — 2500000002 HC RX 250 W HCPCS SELF ADMINISTERED DRUGS (ALT 637 FOR MEDICARE OP, ALT 636 FOR OP/ED): Mod: MUE | Performed by: NURSE PRACTITIONER

## 2025-02-20 PROCEDURE — 2500000001 HC RX 250 WO HCPCS SELF ADMINISTERED DRUGS (ALT 637 FOR MEDICARE OP): Performed by: EMERGENCY MEDICINE

## 2025-02-20 PROCEDURE — 93306 TTE W/DOPPLER COMPLETE: CPT

## 2025-02-20 PROCEDURE — 93306 TTE W/DOPPLER COMPLETE: CPT | Performed by: INTERNAL MEDICINE

## 2025-02-20 PROCEDURE — 96366 THER/PROPH/DIAG IV INF ADDON: CPT

## 2025-02-20 RX ORDER — PANTOPRAZOLE SODIUM 40 MG/1
40 TABLET, DELAYED RELEASE ORAL
Qty: 30 TABLET | Refills: 0 | Status: SHIPPED | OUTPATIENT
Start: 2025-02-20 | End: 2025-03-22

## 2025-02-20 RX ORDER — DOBUTAMINE HYDROCHLORIDE 100 MG/100ML
5-40 INJECTION INTRAVENOUS CONTINUOUS
Status: DISCONTINUED | OUTPATIENT
Start: 2025-02-20 | End: 2025-02-20 | Stop reason: HOSPADM

## 2025-02-20 RX ORDER — SPIRONOLACTONE 25 MG/1
50 TABLET ORAL DAILY
Status: DISCONTINUED | OUTPATIENT
Start: 2025-02-20 | End: 2025-02-20 | Stop reason: HOSPADM

## 2025-02-20 RX ORDER — REGADENOSON 0.08 MG/ML
0.4 INJECTION, SOLUTION INTRAVENOUS
Status: CANCELLED | OUTPATIENT
Start: 2025-02-20

## 2025-02-20 RX ORDER — HYDRALAZINE HYDROCHLORIDE 25 MG/1
25 TABLET, FILM COATED ORAL AS NEEDED
Status: DISCONTINUED | OUTPATIENT
Start: 2025-02-20 | End: 2025-02-20

## 2025-02-20 RX ORDER — SERTRALINE HYDROCHLORIDE 50 MG/1
25 TABLET, FILM COATED ORAL
Status: DISCONTINUED | OUTPATIENT
Start: 2025-02-20 | End: 2025-02-20

## 2025-02-20 RX ORDER — ACETAMINOPHEN 160 MG/5ML
650 SOLUTION ORAL EVERY 4 HOURS PRN
Status: DISCONTINUED | OUTPATIENT
Start: 2025-02-20 | End: 2025-02-20 | Stop reason: HOSPADM

## 2025-02-20 RX ORDER — SPIRONOLACTONE 50 MG/1
50 TABLET, FILM COATED ORAL DAILY
COMMUNITY

## 2025-02-20 RX ORDER — ASPIRIN 81 MG/1
81 TABLET ORAL DAILY
COMMUNITY

## 2025-02-20 RX ORDER — ROSUVASTATIN CALCIUM 10 MG/1
1 TABLET, COATED ORAL
COMMUNITY
Start: 2024-12-30

## 2025-02-20 RX ORDER — ROSUVASTATIN CALCIUM 10 MG/1
10 TABLET, COATED ORAL DAILY
Status: DISCONTINUED | OUTPATIENT
Start: 2025-02-20 | End: 2025-02-20 | Stop reason: HOSPADM

## 2025-02-20 RX ORDER — SERTRALINE HYDROCHLORIDE 50 MG/1
25 TABLET, FILM COATED ORAL DAILY
Status: DISCONTINUED | OUTPATIENT
Start: 2025-02-20 | End: 2025-02-20 | Stop reason: HOSPADM

## 2025-02-20 RX ORDER — ACETAMINOPHEN 325 MG/1
975 TABLET ORAL ONCE
Status: COMPLETED | OUTPATIENT
Start: 2025-02-20 | End: 2025-02-20

## 2025-02-20 RX ORDER — ERGOCALCIFEROL 1.25 MG/1
1 CAPSULE ORAL
COMMUNITY
Start: 2024-07-29

## 2025-02-20 RX ORDER — AMINOPHYLLINE 25 MG/ML
125 INJECTION, SOLUTION INTRAVENOUS AS NEEDED
Status: CANCELLED | OUTPATIENT
Start: 2025-02-20

## 2025-02-20 RX ORDER — ACETAMINOPHEN 325 MG/1
650 TABLET ORAL EVERY 4 HOURS PRN
Status: DISCONTINUED | OUTPATIENT
Start: 2025-02-20 | End: 2025-02-20 | Stop reason: HOSPADM

## 2025-02-20 RX ORDER — ONDANSETRON HYDROCHLORIDE 2 MG/ML
4 INJECTION, SOLUTION INTRAVENOUS EVERY 8 HOURS PRN
Status: DISCONTINUED | OUTPATIENT
Start: 2025-02-20 | End: 2025-02-20 | Stop reason: HOSPADM

## 2025-02-20 RX ORDER — ONDANSETRON 4 MG/1
4 TABLET, FILM COATED ORAL EVERY 8 HOURS PRN
Status: DISCONTINUED | OUTPATIENT
Start: 2025-02-20 | End: 2025-02-20 | Stop reason: HOSPADM

## 2025-02-20 RX ORDER — POLYETHYLENE GLYCOL 3350 17 G/17G
17 POWDER, FOR SOLUTION ORAL DAILY PRN
Status: DISCONTINUED | OUTPATIENT
Start: 2025-02-20 | End: 2025-02-20 | Stop reason: HOSPADM

## 2025-02-20 RX ORDER — ACETAMINOPHEN 650 MG/1
650 SUPPOSITORY RECTAL EVERY 4 HOURS PRN
Status: DISCONTINUED | OUTPATIENT
Start: 2025-02-20 | End: 2025-02-20 | Stop reason: HOSPADM

## 2025-02-20 RX ORDER — ENOXAPARIN SODIUM 100 MG/ML
30 INJECTION SUBCUTANEOUS EVERY 24 HOURS
Status: DISCONTINUED | OUTPATIENT
Start: 2025-02-20 | End: 2025-02-20 | Stop reason: HOSPADM

## 2025-02-20 RX ORDER — TRIAMCINOLONE ACETONIDE 1 MG/G
OINTMENT TOPICAL
COMMUNITY
Start: 2024-09-05

## 2025-02-20 RX ORDER — ATROPINE SULFATE 0.1 MG/ML
.25-2 INJECTION INTRAVENOUS
Status: DISCONTINUED | OUTPATIENT
Start: 2025-02-20 | End: 2025-02-20 | Stop reason: HOSPADM

## 2025-02-20 RX ADMIN — DOBUTAMINE HYDROCHLORIDE 20 MCG/KG/MIN: 100 INJECTION INTRAVENOUS at 15:37

## 2025-02-20 RX ADMIN — SPIRONOLACTONE 50 MG: 25 TABLET ORAL at 09:43

## 2025-02-20 RX ADMIN — ROSUVASTATIN 10 MG: 10 TABLET, FILM COATED ORAL at 09:43

## 2025-02-20 RX ADMIN — ACETAMINOPHEN 975 MG: 325 TABLET, FILM COATED ORAL at 01:52

## 2025-02-20 RX ADMIN — ENOXAPARIN SODIUM 30 MG: 100 INJECTION SUBCUTANEOUS at 09:43

## 2025-02-20 ASSESSMENT — PAIN - FUNCTIONAL ASSESSMENT
PAIN_FUNCTIONAL_ASSESSMENT: 0-10
PAIN_FUNCTIONAL_ASSESSMENT: 0-10

## 2025-02-20 ASSESSMENT — PAIN SCALES - GENERAL
PAINLEVEL_OUTOF10: 0 - NO PAIN
PAINLEVEL_OUTOF10: 0 - NO PAIN
PAINLEVEL_OUTOF10: 7
PAINLEVEL_OUTOF10: 0 - NO PAIN

## 2025-02-20 ASSESSMENT — PAIN DESCRIPTION - DESCRIPTORS: DESCRIPTORS: PRESSURE

## 2025-02-20 ASSESSMENT — ENCOUNTER SYMPTOMS: SHORTNESS OF BREATH: 1

## 2025-02-20 ASSESSMENT — ACTIVITIES OF DAILY LIVING (ADL): LACK_OF_TRANSPORTATION: NO

## 2025-02-20 ASSESSMENT — PAIN DESCRIPTION - LOCATION: LOCATION: HEAD

## 2025-02-20 NOTE — CONSULTS
"Inpatient consult to Cardiology  Consult performed by: HERLINDA Bearden  Consult ordered by: HERLINDA Mas  Reason for consult: Chest pain      History Of Present Illness:    Maricruz Guthrie is a 79 y.o. female with past medical history significant Hypertension, Hyperlipidemia, TIA, Guillain-Barré, Chronic kidney disease. Presented with chest pain. Cardiology is consulted for chest pain.     Patient reports for the last week she has been having intermittent episodes of chest discomfort.  Describes the discomfort as \"congestion/heaviness\".  States symptoms felt like they did when she had pneumonia in the past.  Denies any recent sickness including fever, cough, congestion or cold-like symptoms.  States pain is aggravated with exertion and improves with rest.  Initially pain went away after she rested however yesterday pain persisted for at least 10 minutes.  Discomfort radiated to bilateral arms.  Denies any associated symptoms including shortness of breath, diaphoresis, nausea, vomiting, dizziness, lightheadedness, palpitations.  Pain did not change with position and was nonpleuritic.  Given symptoms she opted present to the emergency room for further management.    EKG Showed sinus bradycardia heart rate 58 bpm left axis deviation no acute ischemic changes.  Chest x-ray showed no active disease in the chest. High sensitivity troponin 12/16/16  D-dimer 890  K 4.1 Bun 30 creatinine 2.07 Alt 22 AST 25 WBC 10.4 hemoglobin 13.7 hematocrit 42.1 platelets 232   Initial vital signs temp 36.4 HR 67 RR 20 /66 pulse ox 98% on room air.He was treated with aspirin 243 mg oral x1, Sublingual nitroglycerin, Pepcid 20 mg IVP.     Follows with cardiology CNP Essie Moore. Last seen 5/1/2024.  States she primarily follows for blood pressure management.    Home CV meds  Aspirin 81 mg daily  Crestor 10 mg daily   Spironolactone 50 mg daily      Last Recorded Vitals:  Vitals:    02/20/25 0600 " "02/20/25 0615 02/20/25 0630 02/20/25 0645   BP: 130/59 122/60 112/60    Pulse: (!) 47 (!) 45 (!) 48 62   Resp: 13 16 14 18   Temp:       TempSrc:       SpO2: 97% 98% 96% 95%   Weight:       Height:           Last Labs:  LABS:  CMP:  Results from last 7 days   Lab Units 02/19/25  1515   SODIUM mmol/L 137   POTASSIUM mmol/L 4.1   CHLORIDE mmol/L 102   CO2 mmol/L 23   ANION GAP mmol/L 16   BUN mg/dL 30*   CREATININE mg/dL 2.07*   EGFR mL/min/1.73m*2 24*   MAGNESIUM mg/dL 2.07   ALBUMIN g/dL 4.4   ALT U/L 22   AST U/L 25   BILIRUBIN TOTAL mg/dL 0.5     CBC:  Results from last 7 days   Lab Units 02/19/25  1515   WBC AUTO x10*3/uL 10.4   HEMOGLOBIN g/dL 13.7   HEMATOCRIT % 42.1   PLATELETS AUTO x10*3/uL 232   MCV fL 93     COAG:   Results from last 7 days   Lab Units 02/19/25  1515   INR  1.1     ABO: No results found for: \"ABO\"  HEME/ENDO:     CARDIAC:   Results from last 7 days   Lab Units 02/19/25  1943 02/19/25  1718 02/19/25  1515   TROPHS ng/L 16* 16* 12   No results for input(s): \"CHOL\", \"LDLF\", \"LDL\", \"LDLCALC\", \"HDL\", \"TRIG\" in the last 35733 hours.      Imagine Results  XR chest 1 view   Final Result   No active disease in the chest identified.        MACRO:   None        Signed by: Rom Moya 2/19/2025 3:57 PM   Dictation workstation:   [id number]      NM Lung perfusion with spect/ct    (Results Pending)         Last I/O:  No intake/output data recorded.    Past Cardiology Tests (Last 3 Years):  EKG:  ECG 12 lead         Echo:  6/16/2016   Summary    Normal LV systolic function.    The left ventricular ejection fraction (LVEF) is 70%.    Diastolic LV function assessed by resting Doppler is normal.    Normal RV systolic function.    No left ventricular hypertrophy is present.    No hemodynamically significant valve disease.    Noninvasive hemodynamic assessment is consistent with a normal CVP, a    likely normal LVEDP.    The pulmonary artery systolic pressure could not be estimated.    See above for further " details.       Cath:  No results found for this or any previous visit from the past 1095 days.    Stress Test:  NM MYOCARDIAL PERFUSION MULTI SPECT July 7, 2016   IMPRESSION   Normal pharmacologic rest/stress sestamibi examination of the heart.   There is soft tissue and breast attenuation artifact present.    No ECG evidence of ischemia. For the nuclear imaging results please see     Cardiac Imaging:  No results found for this or any previous visit from the past 1095 days.      Past Medical History:  She has no past medical history on file.    Past Surgical History:  She has no past surgical history on file.      Social History:  She reports that she has quit smoking. Her smoking use included cigarettes. She has never used smokeless tobacco. No history on file for alcohol use and drug use.    Family History:  No family history on file.     Allergies:  Statins-hmg-coa reductase inhibitors, Penicillins, and Niacin    Inpatient Medications:  Scheduled medications   Medication Dose Route Frequency    enoxaparin  30 mg subcutaneous q24h    sertraline  25 mg oral Daily     PRN medications   Medication    acetaminophen    Or    acetaminophen    Or    acetaminophen    hydrALAZINE    nitroglycerin    ondansetron    Or    ondansetron    polyethylene glycol     Continuous Medications   Medication Dose Last Rate     Outpatient Medications:  Current Outpatient Medications   Medication Instructions    chlorthalidone (HYGROTON) 25 mg, oral, Daily, Taking 1/2 pill    cholecalciferol (VITAMIN D-3) 50,000 Units, oral, Weekly    cyanocobalamin, vitamin B-12, 1,000 mcg/mL drops oral, Daily RT    hydrALAZINE (APRESOLINE) 25 mg, oral, As needed    losartan (COZAAR) 25 mg, oral, Daily    rosuvastatin (CRESTOR) 10 mg, oral, Daily RT    sertraline (ZOLOFT) 25 mg, oral, Daily RT    spironolactone (ALDACTONE) 50 mg, oral, Daily       Physical Exam:  GENERAL: alert, cooperative, pleasant, in no acute distress  SKIN: warm, dry  NECK: no  JVD  CARDIAC: Bradycardia and rhythm no murmurs  PULMONARY: Normal respiratory efforts, lungs clear to auscultation bilaterally.  ABDOMEN: soft, nondistended  EXTREMITIES: no lower extremity edema  NEURO: Alert and oriented x 3.  Grossly normal.  Moves all 4 extremities.     I reviewed EKGs, labs and all imaging reports  I reviewed telemetry which showed  SB      Assessment/Plan   Maricruz Guthrie is a 79 y.o. female with past medical history significant Hypertension, Hyperlipidemia, TIA, Guillain-Barré, Chronic kidney disease. Presented with chest pain. Cardiology is consulted for chest pain.     Home CV meds: Aspirin 81 mg daily Crestor 10 mg daily Spironolactone 50 mg daily    #Chest pain  Patient presented with chest discomfort concerning for possible angina.  States pain occurs with activity and improved with rest.  ECG with no acute ischemic changes.  High-sensitivity troponin 12/16/2016  -D-dimer elevated.  VQ scan showed low probability for PE    #Hypertension  BP well-controlled  Only on spironolactone at home    #Hyperlipidemia  On outpatient Crestor    Recommendations  Recommend  restratification with Dobutamine stress test  (unable to complete nuclear lexiscan today due to recent VQ scan).  Patient reports he is unable to exercise. Unable to do coronary CT due to kidney disease.  Echocardiogram    Code Status:  Full Code      Silvia Alex, APRN-CNP    STAFF ADDENDUM:    Both the CADENCE and I have had a face to face encounter with the patient today. I have examined the patient and edited the documented physical examination as necessary.  I personally reviewed the patient's vital signs, telemetry, recent labs, medications, orders, EKGs, and pertinent cardiac imaging/ echocardiography.  I have reviewed the CADENCE's encounter note, approve the CADENCE's documentation and have edited the note to reflect my diagnostic and therapeutic plan.      This is a 79-year-old female with history of HTN, dyslipidemia,  possible prior TIA, CKD, Guillain-Barré who presents with chest pain.  She reports over the past week or so having intermittent episodes of chest pressure.  Particularly with activity.  Tends to go away within a few minutes.  Yesterday she had an episode similar discomfort but then went down both arms and lasted about 10 to 15 minutes.  By the time EMS arrived she was feeling back to baseline.    EKG Showed sinus bradycardia heart rate 58 bpm left axis deviation no acute ischemic changes.  Chest x-ray showed no active disease in the chest. High sensitivity troponin 12/16/16  D-dimer 890  K 4.1 Bun 30 creatinine 2.07 Alt 22 AST 25 WBC 10.4 hemoglobin 13.7 hematocrit 42.1 platelets 232   Initial vital signs temp 36.4 HR 67 RR 20 /66 pulse ox 98% on room air.He was treated with aspirin 243 mg oral x1, Sublingual nitroglycerin, Pepcid 20 mg IVP.     Echocardiogram showing normal LVEF 60%, mild AI, no other structural abnormalities  VQ scan low probability for PE    On exam she is alert and oriented x 3 no apparent distress.  Heart regular rate and rhythm without murmur rub or gallop.  Lungs are clear to auscultation bilaterally.    She has some atypical chest discomfort, but has been worsening with exertion over the past week or so.  Indeterminant troponin level.  No clear etiology as chest x-ray unremarkable as well as VQ scan low probability for PE.  With nuclear scan unable to do nuclear stress test for a couple of days and CT angio is out of the question due to CKD.    Recommendations:  Dobutamine stress echocardiogram.  If unremarkable continue workup for noncardiac chest pain.        Keyon Handy, DO

## 2025-02-20 NOTE — HOSPITAL COURSE
Maricruz Guthrie is a 79 y.o. female presenting with chest pressure that has been ongoing x 1 week. She best describes it as a pressure or heaviness, compares to having pneumonia.  She also endorses shortness of breath.  She initially did not correlate, but also now endorses a mild jaw pain when she was experiencing this.  She denies any dizziness.  She reports this discomfort and shortness of breath she would notice mostly when she was active around the house to the point she had to stop and rest, but would occasionally happen with just sitting.  She also endorses when she would have this chest discomfort it would radiate down both of her arms.     She was admitted to observation unit for further monitoring and workup.  She remained on telemetry.  Her troponins remained negative.  A VQ scan was completed which was negative.  Cardiology did see while here, and a stress test was completed which was negative  She was cleared for discharge.  She was discharged in stable condition.  To follow with PCP upon discharge. She will be discharged on pantoprazole with GI follow up for possible GERD.  Labs/Testing reviewed    Interdisciplinary team rounding completed with hospitalist, nurse, TCC    NP discussed plan and lab/testing results with Dr. Robles

## 2025-02-20 NOTE — PROGRESS NOTES
Transitional Care Coordination Progress Note:  Plan per Medical/Surgical team: treatment of CP X 1 week with Cardio consult, Vqscan pending   Status: Observation  Payor source: medicare A/B, [employer]   Discharge disposition: Home alone   Potential Barriers: renal 30/2.07, trops 12, 16, 16, DDIMER 890  ADOD: 2/20/2025   LUCIO Lucia RN, BSN Transitional Care Coordinator ED# [phone]      02/20/25 0659   Discharge Planning   Living Arrangements Alone   Support Systems Children   Assistance Needed Cardio consult  Vqscan pending   Type of Residence Private residence   Number of Stairs to Enter Residence 3   Number of Stairs Within Residence 0   Home or Post Acute Services None   Expected Discharge Disposition Home   Does the patient need discharge transport arranged? No   Financial Resource Strain   How hard is it for you to pay for the very basics like food, housing, medical care, and heating? Not hard   Housing Stability   In the last 12 months, was there a time when you were not able to pay the mortgage or rent on time? N   In the past 12 months, how many times have you moved where you were living? 1   At any time in the past 12 months, were you homeless or living in a shelter (including now)? N   Transportation Needs   In the past 12 months, has lack of transportation kept you from medical appointments or from getting medications? no   In the past 12 months, has lack of transportation kept you from meetings, work, or from getting things needed for daily living? No   Stroke Family Assessment   Stroke Family Assessment Needed No   Intensity of Service   Intensity of Service 0-30 min

## 2025-02-20 NOTE — DISCHARGE INSTRUCTIONS
[facility] Observation Unit Discharge Instructions  You came to the hospital with chest pain  and were admitted for observation and further care.   You were treated with lab work, monitoring on the hear monitor, echcardiogram, and stress test .   A cardiology specialist saw you while admitted and helped manage your care.   Your discharge plan is to go home to recover.    Please see your primary care doctor at [facility] in 1 week for follow-up.   An appointment has been requested for you but may you need to call your doctors office to schedule.    We are starting you on a medication called pantoprazole which is an acid reducing medication in hopes that it helps your symptoms at home since the acid reducing medication helped you here.    We are referring you to gastroenterology (GI).    For any issues or concerns with appointments, call the  scheduling line at [phone] or the providers office directly.       See the attached information for education about any new medications and the condition(s) you were treated for.  Your medications may have changed so pay close attention to the list given to you at discharge and ask any questions you have before leaving the hospital.

## 2025-02-20 NOTE — H&P
History Of Present Illness  Maricruz Guthrie is a 79 y.o. female presenting with chest pressure that has been ongoing x 1 week. She best describes it as a pressure or heaviness, compares to having pneumonia.  She also endorses shortness of breath.  She initially did not correlate, but also now endorses a mild jaw pain when she was experiencing this.  She denies any dizziness.  She reports this discomfort and shortness of breath she would notice mostly when she was active around the house to the point she had to stop and rest, but would occasionally happen with just sitting.  She also endorses when she would have this chest discomfort it would radiate down both of her arms.         PMHX: HTN, HLD  Past Medical History  No past medical history on file.    Surgical History  No past surgical history on file.     Social History  She reports that she has quit smoking. Her smoking use included cigarettes. She has never used smokeless tobacco. No history on file for alcohol use and drug use.    Family History  No family history on file.     Allergies  Statins-hmg-coa reductase inhibitors, Penicillins, and Niacin    Review of Systems   Respiratory:  Positive for shortness of breath.    Cardiovascular:  Positive for chest pain.   All other systems reviewed and are negative.       Physical Exam  Constitutional:       Appearance: Normal appearance.   Cardiovascular:      Rate and Rhythm: Normal rate and regular rhythm.      Pulses: Normal pulses.      Heart sounds: Normal heart sounds. No murmur heard.     No gallop.   Pulmonary:      Effort: Pulmonary effort is normal. No respiratory distress.      Breath sounds: Normal breath sounds. No wheezing or rhonchi.   Abdominal:      General: Abdomen is flat. Bowel sounds are normal. There is no distension.      Palpations: Abdomen is soft.      Tenderness: There is no abdominal tenderness. There is no guarding.   Musculoskeletal:         General: Normal range of motion.   Skin:      General: Skin is warm.      Capillary Refill: Capillary refill takes less than 2 seconds.   Neurological:      Mental Status: She is alert and oriented to person, place, and time.   Psychiatric:         Mood and Affect: Mood normal.         Thought Content: Thought content normal.         Judgment: Judgment normal.          Last Recorded Vitals  Blood pressure 112/60, pulse (!) 49, temperature 36.4 °C (97.5 °F), temperature source Oral, resp. rate 17, height 1.524 m (5'), weight 77.1 kg (170 lb), SpO2 95%.    Relevant Results  Scheduled medications  enoxaparin, 30 mg, subcutaneous, q24h  rosuvastatin, 10 mg, oral, Daily  sertraline, 25 mg, oral, Daily  spironolactone, 50 mg, oral, Daily      Continuous medications     PRN medications  PRN medications: acetaminophen **OR** acetaminophen **OR** acetaminophen, hydrALAZINE, nitroglycerin, ondansetron **OR** ondansetron, polyethylene glycol    .rcnt    NM Lung perfusion with spect/ct    Result Date: 2/20/2025  Interpreted By:  Andrés Pollack, STUDY: NM LUNG PERFUSION WITH SPECT/CT;  2/19/2025 7:47 pm   INDICATION: Signs/Symptoms:elevated D dimer, CP.   COMPARISON: Chest x-ray dated 02/19/2025   ACCESSION NUMBER(S): [id number]   ORDERING CLINICIAN: CHICO CONCEPCION   TECHNIQUE: DIVISION OF NUCLEAR MEDICINE PERFUSION LUNG SCANS   Multiple perfusion images of the lungs were acquired after the intravenous administration of 4.0 mCi of Tc-99m macroaggregated albumin (MAA). In addition, SPECT/CT of the chest was performed.   FINDINGS: Perfusion images of both lungs demonstrate mild heterogeneity throughout the lung fields bilaterally. There are no distinct segmental perfusion defects seen.       There is nonspecific heterogeneity in perfusion of both lungs indicating low probability for acute pulmonary embolism.     The interpretation above is based on modified PIOPED II and PISAPED criteria.   This study was analyzed and interpreted at [place], [place].    MACRO: None   Signed by: Andrés Pollack 2/20/2025 7:32 AM Dictation workstation:   [id number]    XR chest 1 view    Result Date: 2/19/2025  Interpreted By:  Rom Moya, STUDY: XR CHEST 1 VIEW;  2/19/2025 3:46 pm   INDICATION: Signs/Symptoms:Chest Pain.   COMPARISON: None.   ACCESSION NUMBER(S): [id number]   ORDERING CLINICIAN: CHICO CONCEPCION   FINDINGS: The patient is rotated which can limit evaluation. The lungs appear clear. No apparent pleural effusion. Unremarkable cardiomediastinal silhouette. No pulmonary vascular congestion.       No active disease in the chest identified.   MACRO: None   Signed by: Rom Moya 2/19/2025 3:57 PM Dictation workstation:   [id number]        Assessment/Plan   Assessment & Plan  Chest pressure    Chest pain  -BERKLEY  -D dimer 890  -VQ ordered neg   -BNP  -telemetry  -cardiology consulted, appreciate recs  -CXR neg  -echo ordered   -stress test     CKD  -creat on admit 2.07  -baseline creat 1.8-2  -avoid nephrotoxic agents      DVT prophylaxis:  Lovenox, SCD    DC plan:  DC home when medically stable    Labs/Testing reviewed    Interdisciplinary team rounding completed with hospitalist, nurse, TCC    NP discussed plan and lab/testing results with Dr. Travis ROJAS spent 45 minutes in the professional and overall care of this patient.      Doreen Escobar, APRN-CNP

## 2025-02-20 NOTE — PROGRESS NOTES
02/20/25 0659   [facility] Disability Status   Are you deaf or do you have serious difficulty hearing? N   Are you blind or do you have serious difficulty seeing, even when wearing glasses? N   Because of a physical, mental, or emotional condition, do you have serious difficulty concentrating, remembering, or making decisions? (5 years old or older) N   Do you have serious difficulty walking or climbing stairs? N   Do you have serious difficulty dressing or bathing? N   Because of a physical, mental, or emotional condition, do you have serious difficulty doing errands alone such as visiting the doctor? N

## 2025-02-20 NOTE — DISCHARGE SUMMARY
Discharge Diagnosis  Chest pressure    Issues Requiring Follow-Up  PCP  GI    Discharge Meds     Medication List      START taking these medications     pantoprazole 40 mg EC tablet; Commonly known as: ProtoNix; Take 1 tablet   (40 mg) by mouth once daily in the morning. Take before meals. Do not   crush, chew, or split.     CONTINUE taking these medications     aspirin 81 mg EC tablet   chlorthalidone 25 mg tablet; Commonly known as: Hygroton   ergocalciferol 1250 mcg (50,000 units) capsule; Commonly known as:   Vitamin D-2   sertraline 25 mg tablet; Commonly known as: Zoloft   * spironolactone 50 mg tablet; Commonly known as: Aldactone   * spironolactone 50 mg tablet; Commonly known as: Aldactone; Take 1   tablet (50 mg) by mouth once daily.   triamcinolone 0.1 % ointment; Commonly known as: Kenalog  * This list has 2 medication(s) that are the same as other medications   prescribed for you. Read the directions carefully, and ask your doctor or   other care provider to review them with you.     STOP taking these medications     hydrALAZINE 25 mg tablet; Commonly known as: Apresoline   rosuvastatin 5 mg tablet; Commonly known as: Crestor     ASK your doctor about these medications     cholecalciferol 50,000 unit capsule; Commonly known as: Vitamin D-3   cyanocobalamin (vitamin B-12) 1,000 mcg/mL drops   losartan 25 mg tablet; Commonly known as: Cozaar; Take 1/2 (one-half)   tablet by mouth once daily   rosuvastatin 10 mg tablet; Commonly known as: Crestor       Test Results Pending At Discharge  Pending Labs       Order Current Status    Lipid Panel Collected (02/19/25 1943)    Troponin I, High Sensitivity Collected (02/19/25 1943)            Hospital Course  Maricruz Guthrie is a 79 y.o. female presenting with chest pressure that has been ongoing x 1 week. She best describes it as a pressure or heaviness, compares to having pneumonia.  She also endorses shortness of breath.  She initially did not correlate, but  also now endorses a mild jaw pain when she was experiencing this.  She denies any dizziness.  She reports this discomfort and shortness of breath she would notice mostly when she was active around the house to the point she had to stop and rest, but would occasionally happen with just sitting.  She also endorses when she would have this chest discomfort it would radiate down both of her arms.     She was admitted to observation unit for further monitoring and workup.  She remained on telemetry.  Her troponins remained negative.  A VQ scan was completed which was negative.  Cardiology did see while here, and a stress test was completed which was negative  She was cleared for discharge.  She was discharged in stable condition.  To follow with PCP upon discharge. She will be discharged on pantoprazole with GI follow up for possible GERD.  Labs/Testing reviewed    Interdisciplinary team rounding completed with hospitalist, nurse, TCC    NP discussed plan and lab/testing results with Dr. Robles         Pertinent Physical Exam At Time of Discharge  Physical Exam    Outpatient Follow-Up  Future Appointments   Date Time Provider Department Center   5/1/2025 10:00 AM Essie Moore APRN-CNP [id number]         Doreen Escobar APRN-CNP

## 2025-02-20 NOTE — PROGRESS NOTES
Pharmacy Medication History     Source of Information: PATIENT    Additional concerns with the patient's PTA list.     The following updates were made to the Prior to Admission medication list:     Medications ADDED:   TRIAMCINOLONE 0.1 % OINT  ASPIRIN 81 MG  Medications CHANGED:  ROSUVASTATIN 10 MG- DOSE  Medications REMOVED:   CYANOCOBALAMIN 1000 MCG/ ML  LOSARTAN 25 MG  Medications NOT TAKING:   HYDRALAZINE 25 MG    Allergy reviewed : Yes    Meds 2 Beds : Yes    Outpatient pharmacy confirmed and updated in chart : Yes    [facility]: [facility]    The list below reflectives the updated PTA list. Please review each medication in order reconciliation for additional clarification and justification.    Prior to Admission Medications   Prescriptions Last Dose Informant     aspirin 81 mg EC tablet 2/19/2025 Morning      Sig: Take 1 tablet (81 mg) by mouth once daily.   chlorthalidone (Hygroton) 25 mg tablet 2/19/2025 Morning      Sig: Take 1 tablet (25 mg) by mouth once daily. Taking 1/2 pill                             ergocalciferol (Vitamin D-2) 1250 mcg (50,000 units) capsule 2/18/2025 Morning      Sig: Take 1 capsule (1,250 mcg) by mouth 1 (one) time per week.   hydrALAZINE (Apresoline) 25 mg tablet Not Taking      Sig: Take 1 tablet (25 mg) by mouth if needed (Take for systolic > 160. Can take up to 3 times a day).   Patient not taking: Reported on 2/20/2025                             rosuvastatin (Crestor) 5 mg tablet 2/19/2025 Morning      Sig: Take 2 tablets (10 mg) by mouth once daily.                       spironolactone (Aldactone) 50 mg tablet 2/19/2025 Morning      Sig: Take 1 tablet (50 mg) by mouth once daily.   triamcinolone (Kenalog) 0.1 % ointment 2/19/2025 Morning      Sig: APPLY THIN LAYER TOPICALLY TO AFFECTED AREA THREE TIMES DAILY AS DIRECTED BY PHYSICIAN      Facility-Administered Medications: None       The list below reflectives the updated allergy list. Please review each documented  allergy for additional clarification and justification.    Allergies   Allergen Reactions    Statins-Hmg-Coa Reductase Inhibitors Other and Unknown     severe muscle cramps/aches    Hydrochlorothiazide Unknown     Patient states she fainted after taking medication.    Lidocaine Itching    Penicillins Other     Causes yeast infection    Yeast infection severe    Niacin Rash          02/20/25 at 12:50 PM - Marjorie Cerda

## 2025-03-31 DIAGNOSIS — I10 HYPERTENSION, UNSPECIFIED TYPE: ICD-10-CM

## 2025-03-31 RX ORDER — SPIRONOLACTONE 50 MG/1
50 TABLET, FILM COATED ORAL DAILY
Qty: 90 TABLET | Refills: 0 | Status: SHIPPED | OUTPATIENT
Start: 2025-03-31

## 2025-05-01 ENCOUNTER — APPOINTMENT (OUTPATIENT)
Dept: CARDIOLOGY | Facility: CLINIC | Age: 80
End: 2025-05-01
Payer: MEDICARE

## 2025-05-01 ENCOUNTER — TELEPHONE (OUTPATIENT)
Dept: CARDIOLOGY | Facility: CLINIC | Age: 80
End: 2025-05-01

## 2025-05-01 VITALS
TEMPERATURE: 97.8 F | HEIGHT: 60 IN | BODY MASS INDEX: 35.34 KG/M2 | HEART RATE: 62 BPM | WEIGHT: 180 LBS | DIASTOLIC BLOOD PRESSURE: 74 MMHG | SYSTOLIC BLOOD PRESSURE: 120 MMHG

## 2025-05-01 DIAGNOSIS — G61.0 GUILLAIN-BARRE: ICD-10-CM

## 2025-05-01 DIAGNOSIS — R00.2 PALPITATIONS: ICD-10-CM

## 2025-05-01 DIAGNOSIS — I10 HYPERTENSION, UNSPECIFIED TYPE: Primary | ICD-10-CM

## 2025-05-01 DIAGNOSIS — R07.9 CHEST PAIN, UNSPECIFIED TYPE: ICD-10-CM

## 2025-05-01 PROCEDURE — 3074F SYST BP LT 130 MM HG: CPT | Performed by: NURSE PRACTITIONER

## 2025-05-01 PROCEDURE — 1160F RVW MEDS BY RX/DR IN RCRD: CPT | Performed by: NURSE PRACTITIONER

## 2025-05-01 PROCEDURE — 99214 OFFICE O/P EST MOD 30 MIN: CPT | Performed by: NURSE PRACTITIONER

## 2025-05-01 PROCEDURE — 99212 OFFICE O/P EST SF 10 MIN: CPT

## 2025-05-01 PROCEDURE — 1159F MED LIST DOCD IN RCRD: CPT | Performed by: NURSE PRACTITIONER

## 2025-05-01 PROCEDURE — 3078F DIAST BP <80 MM HG: CPT | Performed by: NURSE PRACTITIONER

## 2025-05-01 PROCEDURE — 1036F TOBACCO NON-USER: CPT | Performed by: NURSE PRACTITIONER

## 2025-05-01 RX ORDER — ROSUVASTATIN CALCIUM 5 MG/1
5 TABLET, COATED ORAL
Qty: 90 TABLET | Refills: 3 | Status: SHIPPED | OUTPATIENT
Start: 2025-05-01

## 2025-05-01 RX ORDER — SPIRONOLACTONE 50 MG/1
50 TABLET, FILM COATED ORAL DAILY
Qty: 90 TABLET | Refills: 3 | Status: SHIPPED | OUTPATIENT
Start: 2025-05-01

## 2025-05-01 RX ORDER — LOSARTAN POTASSIUM 25 MG/1
12.5 TABLET ORAL DAILY
Qty: 45 TABLET | Refills: 3 | Status: SHIPPED | OUTPATIENT
Start: 2025-05-01 | End: 2026-05-01

## 2025-05-01 NOTE — PROGRESS NOTES
Chief Complaint:   Palpitations     History Of Present Illness:    Maricruz Guthrie is a 79 y.o. female here for follow up palpitations and hypertension. Described as a intermittent racing sensation. Her palpitations have been well controlled over the past year.     She reported cough with lisinopril. I changed her to losartan last May. Was supposed ot call with bp readings, she did not.      Presented to ED 2/19/2025 with chest pain. Noted intermittent chest pressure for 1 week. Radiated to right arm with tingling down right arm. EKG negative for ischemia. Pain was unchanged with SL. Given pepcid. VQ scan showed low probability for PE. Troponin -16-16-12. Underwent dobutamine stress test which was negative for ischemia.     CP 2/2 GERD. She has not had any further episodes.     Was diagnosed with Guillain barre 2 years ago, continues to have issues with it.    Dobutamine Stress Test 2/2025:   1. The resting ejection fraction was estimated at 60 to 65% with a peak exercise ejection fraction estimated at >75%.   2. Adequate level of stress achieved.   3. No echocardiographic or electrocardiographic evidence for ischemia at a maximal infusion.   4. Normal Stress Test.    TTE 2/20/2025   1. Left ventricular ejection fraction is normal, calculated by Caldwell's biplane at 61%.   2. There is mildly reduced right ventricular systolic function.   3. Mild aortic valve regurgitation.    Past Medical History:  She has no past medical history on file.    Past Surgical History:  She has no past surgical history on file.      Social History:  She reports that she has quit smoking. Her smoking use included cigarettes. She has never used smokeless tobacco. No history on file for alcohol use and drug use.    Family History:  Family History[1]    Allergies:  Statins-hmg-coa reductase inhibitors, Hydrochlorothiazide, Lidocaine, Penicillins, and Niacin    Review of Systems  All pertinent systems have been reviewed and are negative  except for what is stated in the history of present illness.    All other systems have been reviewed and are negative and noncontributory to this patient's current ailments.     Visit Vitals  /74   Pulse 62   Temp 36.6 °C (97.8 °F)   Ht 1.524 m (5')   Wt 81.6 kg (180 lb)   BMI 35.15 kg/m²   Smoking Status Former   BSA 1.86 m²     Last Labs:  CBC -  Lab Results   Component Value Date    WBC 10.4 02/19/2025    HGB 13.7 02/19/2025    HCT 42.1 02/19/2025    MCV 93 02/19/2025     02/19/2025       CMP -  Lab Results   Component Value Date    CALCIUM 10.1 02/19/2025    PHOS 3.0 07/01/2024    PROT 7.6 02/19/2025    ALBUMIN 4.4 02/19/2025    AST 25 02/19/2025    ALT 22 02/19/2025    ALKPHOS 86 02/19/2025    BILITOT 0.5 02/19/2025       LIPID PANEL -   Lab Results   Component Value Date    CHOL 166 02/19/2025    TRIG 193 (H) 02/19/2025    HDL 36.4 02/19/2025    CHHDL 4.6 02/19/2025    VLDL 39 02/19/2025    NHDL 130 02/19/2025       RENAL FUNCTION PANEL -   Lab Results   Component Value Date    GLUCOSE 103 (H) 02/19/2025     02/19/2025    K 4.1 02/19/2025     02/19/2025    CO2 23 02/19/2025    ANIONGAP 16 02/19/2025    BUN 30 (H) 02/19/2025    CREATININE 2.07 (H) 02/19/2025    CALCIUM 10.1 02/19/2025    PHOS 3.0 07/01/2024    ALBUMIN 4.4 02/19/2025        Lab Results   Component Value Date    HGBA1C 6.4 (H) 07/29/2024         Objective   Vitals reviewed.   Constitutional:       Appearance: Healthy appearance. Not in distress.   Eyes:      Conjunctiva/sclera: Conjunctivae normal.   Neck:      Vascular: No JVR. JVD normal.   Pulmonary:      Effort: Pulmonary effort is normal.      Breath sounds: Normal breath sounds. No wheezing. No rhonchi. No rales.   Chest:      Chest wall: Not tender to palpatation.   Cardiovascular:      PMI at left midclavicular line. Normal rate. Regular rhythm. Normal S1. Normal S2.       Murmurs: There is no murmur.      No gallop.  No click. No rub.   Edema:     Peripheral  edema absent.   Abdominal:      General: Bowel sounds are normal.      Palpations: Abdomen is soft.      Tenderness: There is no abdominal tenderness.   Musculoskeletal: Normal range of motion.         General: No tenderness. Skin:     General: Skin is warm and dry.   Neurological:      General: No focal deficit present.      Mental Status: Alert and oriented to person, place and time.   Psychiatric:         Attention and Perception: Attention normal.         Mood and Affect: Mood normal.       Assessment/Plan   Diagnoses and all orders for this visit:  Hypertension, unspecified type  - bp well controlled   - continue losartan and anca   Palpitations  - resolved   Guillain barre  - refer to neuro     Follow up annually     Outpatient Medications:  Current Outpatient Medications   Medication Instructions    aspirin 81 mg, Daily    ergocalciferol (Vitamin D-2) 1250 mcg (50,000 units) capsule 1 capsule, Once Weekly    losartan (COZAAR) 12.5 mg, oral, Daily    pantoprazole (PROTONIX) 40 mg, oral, Daily before breakfast, Do not crush, chew, or split.    rosuvastatin (CRESTOR) 5 mg, oral, Daily (0630)    spironolactone (ALDACTONE) 50 mg, oral, Daily    triamcinolone (Kenalog) 0.1 % ointment APPLY THIN LAYER TOPICALLY TO AFFECTED AREA THREE TIMES DAILY AS DIRECTED BY PHYSICIAN       Exclusive of any other services or procedures performed, I, Essie PATE, spent 30 minutes in duration for this visit today.  This time consisted of chart review, obtaining history, and/or performing the exam as documented above, as well as, documenting the clinical information for the encounter in the electronic record, discussing treatment options, plans, and/or goals with patient, family, and/or caregiver, refilling medications, updating the electronic record, ordering medicines, lab work, imaging, referrals, and/or procedures as documented above and communicating with other [facility] professionals. I have discussed the results of  laboratory, radiology, and cardiology studies with the patient and their family/caregiver.       I reviewed hospital records, cardiac testing, labs         [1] No family history on file.

## 2025-05-05 ENCOUNTER — TELEPHONE (OUTPATIENT)
Dept: CARDIOLOGY | Facility: CLINIC | Age: 80
End: 2025-05-05
Payer: MEDICARE

## 2025-05-05 NOTE — TELEPHONE ENCOUNTER
Pt MARILYNN stating that when she saw you last week you were going to get her a name of a neurologist for her Guillain-Kenvil.  Please advise

## 2025-07-29 DIAGNOSIS — I10 ESSENTIAL HYPERTENSION: Primary | ICD-10-CM

## 2025-07-31 RX ORDER — AMLODIPINE BESYLATE 5 MG/1
5 TABLET ORAL DAILY
COMMUNITY
End: 2025-07-31 | Stop reason: SDUPTHER

## 2025-07-31 RX ORDER — AMLODIPINE BESYLATE 5 MG/1
5 TABLET ORAL DAILY
Qty: 90 TABLET | Refills: 1 | Status: SHIPPED | OUTPATIENT
Start: 2025-07-31 | End: 2026-01-27

## 2025-10-06 ENCOUNTER — APPOINTMENT (OUTPATIENT)
Dept: NEPHROLOGY | Facility: CLINIC | Age: 80
End: 2025-10-06
Payer: MEDICARE